# Patient Record
Sex: MALE | Race: WHITE
[De-identification: names, ages, dates, MRNs, and addresses within clinical notes are randomized per-mention and may not be internally consistent; named-entity substitution may affect disease eponyms.]

---

## 2019-11-25 ENCOUNTER — HOSPITAL ENCOUNTER (OUTPATIENT)
Dept: HOSPITAL 84 - D.HCCARDIO | Age: 54
Discharge: HOME | End: 2019-11-25
Attending: INTERNAL MEDICINE
Payer: MEDICARE

## 2019-11-25 DIAGNOSIS — I25.10: Primary | ICD-10-CM

## 2019-12-16 ENCOUNTER — HOSPITAL ENCOUNTER (OUTPATIENT)
Dept: HOSPITAL 84 - D.CATH | Age: 54
Discharge: HOME | End: 2019-12-16
Attending: INTERNAL MEDICINE
Payer: MEDICARE

## 2019-12-16 VITALS
BODY MASS INDEX: 23.85 KG/M2 | HEIGHT: 71 IN | BODY MASS INDEX: 23.85 KG/M2 | HEIGHT: 71 IN | WEIGHT: 170.36 LBS | WEIGHT: 170.36 LBS

## 2019-12-16 VITALS — SYSTOLIC BLOOD PRESSURE: 135 MMHG | DIASTOLIC BLOOD PRESSURE: 75 MMHG

## 2019-12-16 DIAGNOSIS — I25.119: Primary | ICD-10-CM

## 2019-12-16 DIAGNOSIS — R94.30: ICD-10-CM

## 2019-12-16 DIAGNOSIS — R06.02: ICD-10-CM

## 2019-12-16 DIAGNOSIS — Z86.711: ICD-10-CM

## 2019-12-16 LAB
ALT SERPL-CCNC: 21 U/L (ref 10–68)
ANION GAP SERPL CALC-SCNC: 11.1 MMOL/L (ref 8–16)
BASOPHILS NFR BLD AUTO: 0.2 % (ref 0–2)
BUN SERPL-MCNC: 11 MG/DL (ref 7–18)
CALCIUM SERPL-MCNC: 8.9 MG/DL (ref 8.5–10.1)
CHLORIDE SERPL-SCNC: 104 MMOL/L (ref 98–107)
CHOLEST/HDLC SERPL: 3.8 RATIO (ref 2.3–4.9)
CO2 SERPL-SCNC: 28.5 MMOL/L (ref 21–32)
CREAT SERPL-MCNC: 1.1 MG/DL (ref 0.6–1.3)
EOSINOPHIL NFR BLD: 1.4 % (ref 0–7)
ERYTHROCYTE [DISTWIDTH] IN BLOOD BY AUTOMATED COUNT: 14.6 % (ref 11.5–14.5)
GLUCOSE SERPL-MCNC: 88 MG/DL (ref 74–106)
HCT VFR BLD CALC: 40.2 % (ref 42–54)
HDLC SERPL-MCNC: 40 MG/DL (ref 32–96)
HGB BLD-MCNC: 13.7 G/DL (ref 13.5–17.5)
IMM GRANULOCYTES NFR BLD: 0.2 % (ref 0–5)
LDL-HDL RATIO: 2.2 RATIO (ref 1.5–3.5)
LDLC SERPL-MCNC: 89 MG/DL (ref 0–100)
LYMPHOCYTES NFR BLD AUTO: 29.3 % (ref 15–50)
MCH RBC QN AUTO: 32.8 PG (ref 26–34)
MCHC RBC AUTO-ENTMCNC: 34.1 G/DL (ref 31–37)
MCV RBC: 96.2 FL (ref 80–100)
MONOCYTES NFR BLD: 6.1 % (ref 2–11)
NEUTROPHILS NFR BLD AUTO: 62.8 % (ref 40–80)
OSMOLALITY SERPL CALC.SUM OF ELEC: 276 MOSM/KG (ref 275–300)
PLATELET # BLD: 287 10X3/UL (ref 130–400)
PMV BLD AUTO: 10.4 FL (ref 7.4–10.4)
POTASSIUM SERPL-SCNC: 3.6 MMOL/L (ref 3.5–5.1)
RBC # BLD AUTO: 4.18 10X6/UL (ref 4.2–6.1)
SODIUM SERPL-SCNC: 140 MMOL/L (ref 136–145)
TRIGL SERPL-MCNC: 110 MG/DL (ref 30–200)
WBC # BLD AUTO: 12.1 10X3/UL (ref 4.8–10.8)

## 2019-12-16 NOTE — NUR
R GROIN SOFT, DRESSING CDI NO BLEEDING OR SWELLING NOTED.  HOB
ELEVATED SLIGHTLY.  HR 60M, /69, RR 12, SAT 95 ON ROOM AIR.  O2
REMOVED.  CALL LIGHT IN REACH, PT DENIES PAIN OR NEEDS

## 2019-12-16 NOTE — NUR
PT RESTING COMFORTABLY. VSS. RIGHT GROIN DRESSING C/D/I. NO S/S OF
HEMATOMA NOTED. FAMILY HAS BROUGHT HIM FOOD. HE IS TOLERATING DRINK.
DENIES NAUSEA. CALL LIGHT WITHIN REACH. FAMILY AT BEDSIDE. NO NEEDS
AT THIS TIME.

## 2019-12-16 NOTE — NUR
PT RESTING WATCHING TV.  DENIES PAIN OR NEEDS.  R GROIN DRESSING
REMAINS CDI NO BLEEDING OR S/S HEMATOMA NOTED.  HR 62, /65, RR
13, SAT 95 ON 2L/NC.  CALL LIGHT IN REACH, WIFE AT BEDSIDE.

## 2019-12-16 NOTE — NUR
PT HAS HX OF CHRONIC BACK PAIN. NOTIFIED DR. ZAYAS OF PT C/O BACK
PAIN 6/10 AT THIS TIME AND NEED FOR SUPINE POSITION UNTIL 18:00.
ORDERS RECEIVED FOR PERCOCET WHICH IS A HOME MEDICATION FOR PT.

## 2019-12-16 NOTE — NUR
DISCHARGE INSTRUCTIONS REVIEWED W PT AND WIFE, BOTH VERBALIZED
UNDERSTANDING.  IV REMOVED W CATH INTACT,  MONITORS REMOVED.

## 2019-12-16 NOTE — NUR
PT RESTING COMFORTABLY, DENIES NEEDS AT THIS TIME.  R GROIN REMAINS
SOFT, DRESSING CDI NO BLEEDING OR S/S HEMATOMA NOTED.  PEDAL PULSES
PALPABLE.  VSS.  CALL LIGHT IN REACH, FAMILY AT BS.

## 2019-12-16 NOTE — NUR
RIGHT GROIN DRESSING C/D/I. NO S/S OF HEMATOMA NOTED. CALL LIGHT
WITHIN REACH. VSS. PT RESTING COMFORTABLY. REPORTS PAIN IS IMPROVED
AND RATES IT 5/10 AT THIS TIME.

## 2019-12-16 NOTE — HEMODYNAMI
PATIENT:SARANYA CUELLAR                                MEDICAL RECORD: [id number]
: 63                                            LOCATION:D.CAT          
ACCT# [id number]                                       ADMISSION DATE: 19
 
 
 Generatedon:201915:03
Patient name: SARANYA CUELLAR Patient #: [id number] Visit #: [id number] SSN: [id number]
[id number] : 1965
Date of study: 2019
Page: Of
Hemodynamic Procedure Report
****************************
Patient Data
Patient Demographics
Procedure consent was obtained
First Name: SARANYA            Gender: Male
Last Name: ALISA           : 1965
Middle Initial: ALVARO       Age: 54 year(s)
Patient #: [id number]       Race: 
Visit #: [id number]
SSN: [id number]
Accession #:
[id number]
Additional ID: [id number]
Contact details
Address: [address] [place] [address]       Phone: [phone]
State: [place]
City: [place]
Zip code: [address]
Past Medical History
Allergies: No known allergies
Admission
Admission Data
Admission Date: 2019  Admission Time: 10:40
Arrival Date: 2019    Arrival Time: 0:00
Admit Source: Other         Insurance Payor: Medicare
[facility] #: [id number]
Height (in.): 70.87         BSA: 1.96 (m2)
Height (cm.): 180           BMI: 23.77 (kg/m2)
Weight (lbs.): 169.76
Weight (kg.): 77
Lab Results
Lab Result Date: 2019 Lab Result Time: 0:00
Biochemistry
Name         Units    Result                Min      Max
BUN          mg/dl    11       --(-*--)--   7        18
Creatinine   mg/dl    1.1      --(--*-)--   0.6      1.3
eGFR         ml/min   73.92489 *-(----)--   90       120
NONAFRICAN
CBC
Name         Units    Result                Min      Max
Hematocrit   %        40.2     -*(----)--   42       54
Hemoglobin   g/dl     13.7     --(*---)--   13.5     17.5
Procedure
Procedure Types
Cath Procedure
Diagnostic Procedure
[facility] w/Coronaries
 
Sedation Charges
Moderate Sedation up to 15 minutes
PCI Procedure
Coronary Stent
Coronary Stent Initial
Hemochron ACT Test
Procedure Description
Procedure Date
Procedure Date: 2019
Procedure Start Time: 14:30
Procedure End Time: 15:01
Procedure Staff
Name                            Function
Hayden Treadwell MD                   Performing Physician
Guanakito Mancia RT                     Monitor
Manuel Foster RN                  Nurse
Jenna Tubbs RT              Scrub
Janiya Mcgrath RT               Scrub
Marisol Turner RT                Monitor
Procedure Data
Cath Procedure
Fluoroscopy
Diagnostic fluoroscopy      Total fluoroscopy Time: 5
time: 5 min                 min
Diagnostic fluoroscopy      Total fluoroscopy dose: 667
dose: 667 mGy               mGy
Contrast Material
Contrast Material Type                       Amount (ml)
Isovue 370                                   122
Entry Location
Entry     Primary  Successful  Side  Size  Upsize Upsize Entry    Closure Succes
sful  Closure
Location                             (Fr)  1 (Fr) 2 (Fr) Remarks  Device        
      Remarks
Femoral                        Right 5 Fr  6 Fr                   Exoseal
artery                                     Short
Estimated blood loss: 10 ml
Diagnostic catheters
Device Type               Used For           End Catheter
Placement
MULTIPACK Pigtail 5 Fr    Procedure
catheter
MULTIPACK JL 4.0 5Fr      Left Coronary
catheter                  Angiography
MULTIPACK 3DRC 5Fr        Procedure
catheter
Procedure Complications
No complications
Procedure Medications
Medication           Administration Route Dosage
Oxygen               etCO2 Nasal cannula  2 l/min
Lidocaine 2%         added to field       20
Heparin Flush Bag    added to field       2 bags
(1000units/500ml NS)
0.9% NaCl            I.V.                 100 ml/hr
Versed               I.V.                 2 mg
Fentanyl             I.V.                 50 mcg
Versed               I.V.                 1 mg
Heparin Bolus        I.V.                 7500 units
Versed               I.V.                 1 mg
 
Plavix               P.O.                 600 mg
Hemodynamics
Rest
BSA: 1.96 (m2) HGB: 13.7 (g/dl) O2 Consumption: Estimated: 222.65 (ml/min) O2 Co
nsumption indexed:
Estimated:113.6 (ml/min/m) Heart Rate: 57 (bpm)
Pressure Samples
Time     Site     Value (mmHg) Purpose      Heart      Use
Rate(bpm)
14:33    LV       135/1,7      Snapshot     55
14:34    AO       113/68(87)   Pullback     54
14:34    LV       118/3,16     Pullback     54
Gradients
Valve  Time  Site 1   Site 2     Mean    SEP/DFP    Peak To Heart  Use
(mmHg)  (sec/min)  Peak    Rate
(mmHg)  (bpm)
Aortic 14:34 LV       AO         9       16         5       54
118/3,16 113/68(87)
Calculations
Valve    P-P      Mean      Valve     Index  Valve    Source
Name              Gradient  Area             Flow
(cm2)
Aortic   5        9
5        9
Snapshots
Pre Cath      Intra         NCS           Post Cath
Vital Signs
Time     Heart  Resp   SPO2 etCO2   NIBP (mmHg) Rhythm  Pain    Sedation
Rate   (ipm)  (%)  (mmHg)                      Status  Level
(bpm)
14:14:08 58     14     96   0       138/88(114) NSR     0 (11)  10(A)
, No
pain
14:18:24 51     4      95   26.8    124/83(96)  NSR     0 (11)  10(A)
, No
pain
14:22:40 54     12     96   33.6    114/70(87)  NSR     0 (11)  10(A)
, No
pain
14:26:48 55     14     94   34.3    110/57(71)  NSR     0 (11)  10(A)
, No
pain
14:31:00 55     18     94   35.8    107/63(80)  NSR     0 (11)  9(A)
, No
pain
14:35:10 54     13     96   32.8    101/69(83)  NSR     0 (11)  9(A)
, No
pain
14:39:18 55     10     96   32.8    107/66(82)  NSR     0 (11)  9(A)
, No
pain
14:43:26 59     11     96   37.3    111/72(86)  NSR     0 (11)  9(A)
, No
pain
14:47:38 57     15     97   15.6    103/65(76)  NSR     0 (11)  9(A)
, No
pain
14:51:47 60     12     97   34.3    97/63(76)   NSR     0 (11)  9(A)
, No
pain
 
14:55:53 63     14     97   38.8    115/70(80)  NSR     0 (11)  10(A)
, No
pain
15:00:05 55     13     97   32.8    118/74(87)  NSR     0 (11)  10(A)
, No
pain
Medications
Time     Medication       Route   Dose  Verified Delivered Reason          Notes
  Effectiveness
by       by
14:22:31 Oxygen           etCO2   2     Hayden     Buffie    used for
Nasal   l/min Eben Foster RN   procedure
cannula
14:22:39 Lidocaine 2%     added   20ml  Hayden     Hayden      for local
to      vial  Eben Treadwell MD  anesthetic
field
14:22:46 Heparin Flush    added   2     Hayden     Hayden      used for
Bag              to      bags  Eben Treadwell MD  procedure
(1000units/500ml field
NS)
14:22:55 0.9% NaCl        I.V.    100   Hayden     Buffie    Per physician
ml/hr Eben Foster RN
14:27:04 Fentanyl         I.V.    50    Hayden     Buffie    for sedation
mcg   Eben Foster RN
14:27:58 Versed           I.V.    2 mg  Hayden     Buffie    for sedation
Eben Foster RN
14:33:41 Versed           I.V.    1 mg  Hayden     Buffie    for sedation
Eben Foster RN
14:38:08 Versed           I.V.    1 mg  Hayden     Buffie    for sedation
Eben Foster RN
14:46:03 Heparin Bolus    I.V.    7500  Hayden     Buffie    for
units Eben Foster RN   anticoagulation
15:01:40 Plavix           P.O.    600   Hayden     Buffie    for
mg    Eben Foster RN   antiplatelet
therapy
Procedure Log
Time     Note
13:51:31 Informed consent obtained and on chart
13:53:48 Procedure Status Elective Heart Cath (OP).
13:53:55 Time tracking: Regular hours (M-F 7:00 - 5:00)
13:54:05 Plan of Care:Hemodynamics will remain stable., Cardiac
rhythm will remain stable., Comfort level will be
maintained., Respiratory function will remain
adequate., Patient/ family verbilizes understanding of
procedure., Procedure tolerated without complication.,
Recovers from procedure without complications..
13:58:24 Lab Result : BUN 11 mg/dl
13:58:24 Lab Result : Hematocrit 40.2 %
13:58:24 Lab Result : eGFR NONAFRICAN 73.98440 ml/min
13:58:24 Lab Result : Creatinine 1.1 mg/dl
13:58:24 Lab Result : Hemoglobin 13.7 g/dl
14:01:10 Arrival Date: 2019 12:00:00 AM
14:02:03 Insurance Payor : Medicare
14:02:10 Patient Height : 70.87 inches
14:02:16 Patient Weight : 169.76 lbs
14:02:21 Admit Source: Other
14:02:52 Diagnostic Cath Status : Elective
14:03:02 Guanakito Mancia RT(R) sent for patient. Start room use.
14:05:18 Risk of Mortality: .1
14:05:21 Risk of blood transfusion: .1
 
14:05:26 Risk of EVGENY: .3
14:06:08 Stress Test: yes; abnormal INFERIOR AND SEPTAL WALL
14:06:15 Lab results completed and on chart.
14:12:56 Patient received from Pre/Post Procedure Room to CCL 1
Alert and oriented. Tansferred to table in Supine
position.
14:12:57 Warm blankets applied, and josiah hugger turned on for
patient comfort.
14:12:57 Correct patient and procedure confirmed by team.
14:12:58 ECG and BP/O2 sat monitors applied to patient.
14:12:58 Vital chart was started
14:12:59 Baseline sample Acquired.
14:13:02 Rhythm: sinus bradycardia
14:13:04 Full Disclosure recording started
14:13:12 H&P Date Dictated: 2019 Within 30 days and on
chart., H&P Addendum completed by physician on day of
procedure. (MUST COMPLETE FOR ALL OUTPATIENTS).
14:13:14 Pre-procedure instructions explained to patient.
14:13:14 Pre-op teaching completed and patient verbalized
understanding.
14:13:18 Family in patients room.
14:13:19 Patient NPO since Midnight.
14:13:59 Patient allergic to No known allergies
14:14:01 Is the patient allergic to Iodine/contrast media? No.
14:14:02 Is patient on blood thinner?Yes
14:14:32 XARELTO- LAST DOSE 12
14:14:40 Patient diabetic? No.
14:14:50 Previous problem with sedation/anesthesia? No ?
14:14:51 Snore? Yes
14:14:52 Sleep apnea? No
14:14:53 Deviated septum? No
14:14:54 Opens mouth fully? Yes
14:14:55 Sticks out tongue? Yes
14:14:57 Airway obstruction? No ?
14:14:58 Dentures? No ?
14:15:00 Pre procedure: right dorsailis pedis pulse 2+ Normal;
easily identifiable; not easily obliterated
14:15:14 Patient pain scale 0/10 ?.
14:15:25 IV patent on arrival in left hand with 0.9% NaCl at
O.
14:15:33 Right groin area was prepped with chlora-prep and
draped in sterile fashion
14:15:34 Alarms reviewed by R. N.
14:15:34 Sharps counted by scrub and verified by R.N.
14:15:39 Use device set Femoral Dx
14:15:40 ACIST Syringe (02000) opened to sterile field.
14:15:41 Bag Decanter (2002S) opened to sterile field.
14:15:42 ACIST Hand Control (95292) opened to sterile field.
14:15:42 ACIST Manifold (92886) opened to sterile field.
14:15:43 Tegaderm 4 x 4 (1626W) opened to sterile field.
14:15:44 Medline Cath Pack ([id number]) opened to sterile field.
14:15:45 DIAGNOSTIC Multipack 5Fr catheter set (BT9454) opened
to sterile field.
14:15:46 SHEATH 5FR [place] (CTP030) opened to sterile field.
14:15:46 EMERALD Guide Wire (259-091) opened to sterile field.
14:22:31 Oxygen 2 l/min etCO2 Nasal cannula was administered by
Manuel Foster RN; used for procedure; Verbal order read
back and verified.
14:22:39 Lidocaine 2% 20ml vial added to field was administered
by Hayden Treadwell MD; for local anesthetic; Verbal order
 
read back and verified.
14:22:46 Heparin Flush Bag (1000units/500ml NS) 2 bags added to
field was administered by Hayden Treadwell MD; used for
procedure; Verbal order read back and verified.
14:22:55 0.9% NaCl 100 ml/hr I.V. was administered by Manuel Foster RN; Per physician; Verbal order read back and
verified.
14:26:23 --------ALL STOP TIME OUT------
14:26:24 Final Timeout: patient, procedure, and site verified
with staff and physician. All members of the team are
in agreement.
14:26:28 Right groin site verified by team.
14:26:32 Fire Safety Assessment: A--An alcohol-based skin
anteseptic being used preoperatively., C--Open oxygen
or nitrous oxide is being used., D--An ESU, laser, or
fiber-optic light is being used.
14:26:39 Physical assessment completed. ASA score P 2 - A
patient with mild systemic disease as per Hayden Treadwell MD.
14:26:44 2) 60-89 Mildly reduced kidney function, and other
findings (as for stage 1) point to kidney disease.
14:26:49 Maximum allowable contrast dose (3.7 X eGFR X 0.75)205
ml.
14:26:55 Sedation plan: IV Moderate Sedation Medication:Versed,
Fentanyl
14:27:04 Fentanyl 50 mcg I.V. was administered by Manuel Foster
RN; for sedation; Verbal order read back and verified.
14:27:58 Versed 2 mg I.V. was administered by Manuel Foster RN;
for sedation; Verbal order read back and verified.
14:30:38 Procedure started.
14:30:48 Local anesthetic to right femoral artery with
Lidocaine 2% by Hayden Treadwell MD.**INITIAL ACCESS ONLY**
14:32:46 A 5 Fr sheath was inserted into the Right Femoral
artery
14:33:41 Versed 1 mg I.V. was administered by Manuel Foster RN;
for sedation; Verbal order read back and verified.
14:34:16 A MULTIPACK Pigtail 5 Fr catheter was advanced over
the wire and used for Procedure.
14:34:22 LV gram done using ORO
14:34:25 LV hemodynamics recorded.
14:34:29 Injector settings: Ml/sec: 5, Volume: 15,
14:34:37 EF : 55 %
14:34:42 Catheter exchanged over wire.
14:34:48 A MULTIPACK JL 4.0 5Fr catheter was advanced over the
wire and used for Left Coronary Angiography.
14:35:19 LCA angiography performed.
14:38:05 Catheter exchanged over wire.
14:38:08 Versed 1 mg I.V. was administered by Manuel Foster RN;
for sedation; Verbal order read back and verified.
14:38:22 A MULTIPACK 3DRC 5Fr catheter was advanced over the
wire and used for Procedure.
14:38:41 RCA angiography performed.
14:40:03 **ACC**Dominant side:Right
14:40:33 Catheter exchanged over wire.
14:40:38 Proceeding to intervention.
14:41:46 GUIDE 6FR XBLAD 3.5 catheter ([id number]) opened to
sterile field.
14:41:47 TUBING High Pressure Extension Tubing (Eben)
(GE0250L) opened to sterile field.
14:41:48 SHEATH 6FR [place] (VHS315) opened to sterile field.
 
14:41:49 BMW 300cm Straight Universal 2 wire ([id number]) opened
to sterile field.
14:41:50 INFLATOR Merit Zachk (ZK6210) opened to sterile
field.
14:43:10 Sheath upsized to a 6 Fr Short.
14:43:26 6 Fr XBLAD 3.5 guide catheter was inserted over the
wire
14:43:57 Pre PCI Site: Native pLAD has 80% stenosis.
14:44:03  wire advanced.
14:46:03 Heparin Bolus 7500 units I.V. was administered by
Manuel Foster RN; for anticoagulation; Verbal order read
back and verified.
14:47:28 Wire advanced across lesion.
14:54:21 Place stent Inflation Number: 1 A COBRA RX 3.5 X 18
Stent was prepped and advanced across the Prox LAD .
The stent was deployed at 10 JEREMY for 0:00 (min:sec) .
14:54:29 Wire removed.
14:54:30 Guide catheter removed.
14:54:31 Stent catheter was removed intact over wire.
14:54:49 EXOSEAL 6Fr () opened to sterile field.
14:54:59 Sheath removed intact; hemostasis achieved with
Exoseal to the Right Femoral artery.
14:55:21 Procedure ended.(Physican Out)
14:55:35 Fluoroscopy time 05.00 minutes.
14:56:09 Fluoroscopy dose: 667 mGy
14:56:09 Flurop Dose total: 667
14:56:20 Dose Area Product 20999 mGy/cm.
14:56:25 Contrast amount:Isovue 370 122ml.
14:56:28 Maximum allowable dose exceeded? No.
14:56:29 Sharps counted by scrub and verified by R.N.
14:56:35 Post-op/insertion site Right Femoral artery dressed
using a 4 x 4 and Tegaderm.
14:56:42 Post right femoral artery:stable, soft, clean and dry
14:56:48 Post procedure: right dorsailis pedis pulse 2+ Normal;
easily identifiable; not easily obliterated.
14:56:49 Post Procedure Pulses reassessed and unchanged
14:56:53 Post-procedure physical assessment completed. ASA
score P 2 - A patient with mild systemic disease as
per Hayden Treadwell MD.
14:56:56 Post procedure rhythm: unchanged.
14:56:59 Estimated blood loss: 10 ml
14:57:02 Post procedure instruction explained to
patient.Patient verbalizes understanding.
14:57:03 Patient needs reinforcement of post procedure
teaching.
14:57:53 ACT drawn and resulted at OUT OF RANGE seconds.
(normal therapeutic range 180-240 seconds).
14:58:44 Procedure type changed to Cath procedure, Diagnostic
procedure, LHC, C w/Coronaries, Sedation Charges,
Moderate Sedation up to 15 minutes, PCI procedure,
Coronary Stent, Coronary Stent Initial, Hemochron ACT
Test
15:00:09 Procedure and supply charges have been captured,
reviewed, submitted and are correct.
15:00:34 Procedure Complication : No complications
15:00:40 [facility] Findings: MVD- PCI performed (see procedure note)
15:01:04 Operative report dictated upon procedure completion.
15:01:04 See physician's report for complete and final results.
15:01:09 Report given to Pre/Post Procedure Room.
15:01:14 Patient transfered to Pre/Post Procedure Room with
 
Stretcher.
15:01:40 Plavix 600 mg P.O. was administered by Manuel Foster RN;
for antiplatelet therapy; Verbal order read back and
verified.
15:01:46 Vital chart was stopped
15:01:48 Procedure ended.
15:01:48 Full Disclosure recording stopped
15:01:55 End room use (Document Last)
15:02:14 End room use (Document Last)
15:03:05 End room use (Document Last)
Intervention Summary
Intervention Notes
Time     ActionType  Lesion and  Equipment Action#  Pressure  Duration
Attributes  Used
14:54:21 Place stent Prox LAD    COBRA RX  1        10        00:00
3.5 X 18
Stent
Device Usage
Item Name      Manufacture  Quantity  Catalog       Hospital Part       Current 
 Minimal Lot# /
Number        Charge   Number     Stock    Stock   Serial#
Code
ACIST Syringe  Acist        1         28454         407939   761518     353532  
 20
(16723)        Medical
Systems Inc
Bag Decanter   Microtek     1         S         823402   00920      278116  
 5
()        Medical Inc.
ACIST Hand     Acist        1         76881         150123   322050     098708  
 5
Control        Medical
(71795)        Systems Inc
ACIST Manifold Acist        1         84871         824459   713260     101953  
 5
(49098)        Medical
Systems Inc
Tegaderm 4 x 4 3M           1         1626W         742182   775943     689130  
 5
(1626W)
Medline Cath   Medline      1         [id number]     451807   80213      315083  
 5
Pack
([id number])
DIAGNOSTIC     Cardinal     1         XT6572        815725   01031      385130  
 30
Multipack 5Fr  Health
catheter set
(QE9482)
SHEATH 5FR     Terumo       1         AYM542        113210   894360     181274  
 5
[place]
(VIQ394)
EMERALD Guide  Cardinal     1         502-455       225259   154575     855213  
 5
Wire (502-455) Health
MULTIPACK      Cardinal     1                                           465500  
 5
Pigtail 5 Fr   Health
catheter
 
MULTIPACK JL   Cardinal     1                                           094440  
 5
4.0 5Fr        Health
catheter
MULTIPACK 3DRC Cardinal     1                                           775235  
 5
5Fr catheter   Health
GUIDE 6FR      Cardinal     1         [id number]      777983   850089     091374  
 10
XBLAD 3.5      Health
catheter
([id number])
TUBING High    Merit        1         [id number]       719809   74813      041913  
 10
Pressure       Medical
Extension
Tubing (Treadwell)
([id number])
SHEATH 6FR     Terumo       1         VUK498        076024   558043     832634  
 40
[place]
(YWB570)
BMW 300cm      Abbott       1         2033466       684165   428289     795949  
 5
Straight       Vascular
Universal 2
wire (5448401)
INFLATOR Merit Merit        1         HC9702        608111   650290     143017  
 15
LayerVault    Medical
(IG4721)
COBRA RX 3.5 X Celonova     1         [id number]  697532   391704069  [id number]
 1       [id number]
18 stent       [employer]
([id number])
EXOSEAL 6Fr    Cardinal     1                  596222   271711     228832  
 10
()        Health
Signature Audit [place]
Stage           Time        Signature      Unsigned
Intra-Procedure 2019  Marisol Turner
3:02:14 PM  RT(R)
Intra-Procedure 2019  Manuel Foster RN
3:03:05 PM
Intra-Procedure 2019  Hayden Treadwell MD
3:03:23 PM
 
 
 
 
 
 
 
 
 
[address] [place] [address]

## 2019-12-16 NOTE — NUR
PT ARRIVED BY STRETCHER. PLACED ON MONITORS. ASSESSMENT COMPLETED.
VSS. FAMILY AT BEDSIDE. DR. ZAYAS ROUNDED AND SPOKE WITH PT AND PT'S
FAMILY. CALL LIGHT WITHIN REACH.